# Patient Record
Sex: FEMALE | Race: WHITE | NOT HISPANIC OR LATINO | URBAN - METROPOLITAN AREA
[De-identification: names, ages, dates, MRNs, and addresses within clinical notes are randomized per-mention and may not be internally consistent; named-entity substitution may affect disease eponyms.]

---

## 2017-11-02 ENCOUNTER — IMPORTED ENCOUNTER (OUTPATIENT)
Dept: URBAN - METROPOLITAN AREA CLINIC 59 | Facility: CLINIC | Age: 77
End: 2017-11-02

## 2017-11-02 PROBLEM — E11.9 TYPE II DM W/O COMPLICATIONS: Noted: 2017-11-02

## 2017-11-02 PROBLEM — H26.493 OTHER SECONDARY CATARACT, BILATERAL: Noted: 2017-11-02

## 2017-11-02 PROBLEM — H43.393 OTHER VITREOUS OPACITIES, BILATERAL: Noted: 2017-11-02

## 2017-11-02 PROBLEM — H02.34 BLEPHAROCHALASIS OF LT UPPER EYELID: Noted: 2017-11-02

## 2017-11-02 PROBLEM — H04.123 TEAR FILM INSUFFICIENCY OF BILATERAL LACRIMAL GLANDS: Noted: 2017-11-02

## 2017-11-02 PROBLEM — H02.31 PSEUDOPTOSIS OF RIGHT UPPER EYELID: Noted: 2017-11-02

## 2017-11-02 PROCEDURE — 92014 COMPRE OPH EXAM EST PT 1/>: CPT

## 2019-03-18 ENCOUNTER — IMPORTED ENCOUNTER (OUTPATIENT)
Dept: URBAN - METROPOLITAN AREA CLINIC 59 | Facility: CLINIC | Age: 79
End: 2019-03-18

## 2019-03-18 PROBLEM — H02.34 BLEPHAROCHALASIS OF LT UPPER EYELID: Noted: 2019-03-18

## 2019-03-18 PROBLEM — H43.393 OTHER VITREOUS OPACITIES, BILATERAL: Noted: 2019-03-18

## 2019-03-18 PROBLEM — E11.9 TYPE II DM W/O COMPLICATIONS: Noted: 2019-03-18

## 2019-03-18 PROBLEM — H04.123 TEAR FILM INSUFFICIENCY OF BILATERAL LACRIMAL GLANDS: Noted: 2019-03-18

## 2019-03-18 PROBLEM — H26.493 OTHER SECONDARY CATARACT, BILATERAL: Noted: 2019-03-18

## 2019-03-18 PROBLEM — H02.31 PSEUDOPTOSIS OF RIGHT UPPER EYELID: Noted: 2019-03-18

## 2019-03-18 PROCEDURE — 92014 COMPRE OPH EXAM EST PT 1/>: CPT

## 2020-08-12 ENCOUNTER — IMPORTED ENCOUNTER (OUTPATIENT)
Dept: URBAN - METROPOLITAN AREA CLINIC 59 | Facility: CLINIC | Age: 80
End: 2020-08-12

## 2020-08-12 PROBLEM — H26.492 OTHER SECONDARY CATARACT, LEFT EYE: Noted: 2020-08-12

## 2020-08-12 PROBLEM — H43.393 OTHER VITREOUS OPACITIES, BILATERAL: Noted: 2020-08-12

## 2020-08-12 PROBLEM — H26.491 PRESENCE OF INTRAOCULAR LENS: Noted: 2020-08-12

## 2020-08-12 PROBLEM — H02.34 BLEPHAROCHALASIS OF LT UPPER EYELID: Noted: 2020-08-12

## 2020-08-12 PROBLEM — H04.123 TEAR FILM INSUFFICIENCY OF BILATERAL LACRIMAL GLANDS: Noted: 2020-08-12

## 2020-08-12 PROBLEM — H02.31 PSEUDOPTOSIS OF RIGHT UPPER EYELID: Noted: 2020-08-12

## 2020-08-12 PROBLEM — E11.9 TYPE II DM W/O COMPLICATIONS: Noted: 2020-08-12

## 2020-08-12 PROCEDURE — 92014 COMPRE OPH EXAM EST PT 1/>: CPT

## 2020-09-17 ENCOUNTER — APPOINTMENT (RX ONLY)
Dept: URBAN - METROPOLITAN AREA CLINIC 348 | Facility: CLINIC | Age: 80
Setting detail: DERMATOLOGY
End: 2020-09-17

## 2020-09-17 DIAGNOSIS — L21.8 OTHER SEBORRHEIC DERMATITIS: ICD-10-CM

## 2020-09-17 PROBLEM — L29.8 OTHER PRURITUS: Status: ACTIVE | Noted: 2020-09-17

## 2020-09-17 PROCEDURE — 99202 OFFICE O/P NEW SF 15 MIN: CPT

## 2020-09-17 PROCEDURE — ? PRESCRIPTION

## 2020-09-17 PROCEDURE — ? TREATMENT REGIMEN

## 2020-09-17 PROCEDURE — ? COUNSELING

## 2020-09-17 RX ORDER — CLOBETASOL PROPIONATE 0.5 MG/ML
SOLUTION TOPICAL
Qty: 1 | Refills: 2 | Status: ERX | COMMUNITY
Start: 2020-09-17

## 2020-09-17 RX ADMIN — CLOBETASOL PROPIONATE: 0.5 SOLUTION TOPICAL at 00:00

## 2020-09-17 ASSESSMENT — LOCATION ZONE DERM: LOCATION ZONE: SCALP

## 2020-09-17 ASSESSMENT — LOCATION SIMPLE DESCRIPTION DERM: LOCATION SIMPLE: POSTERIOR SCALP

## 2020-09-17 ASSESSMENT — LOCATION DETAILED DESCRIPTION DERM: LOCATION DETAILED: POSTERIOR MID-PARIETAL SCALP

## 2020-09-17 NOTE — PROCEDURE: TREATMENT REGIMEN
Initiate Treatment: Clobetasol Solution - Apply to scalp twice daily x 2-3 weeks then only as needed for flares//avoid eyes & face
Discontinue Regimen: Jesse SIEGEL tree shampoo
Detail Level: Zone
Samples Given: Free and clear w/ 2% zinc

## 2020-10-15 ENCOUNTER — APPOINTMENT (RX ONLY)
Dept: URBAN - METROPOLITAN AREA CLINIC 348 | Facility: CLINIC | Age: 80
Setting detail: DERMATOLOGY
End: 2020-10-15

## 2020-10-15 DIAGNOSIS — L21.8 OTHER SEBORRHEIC DERMATITIS: ICD-10-CM | Status: IMPROVED

## 2020-10-15 PROCEDURE — ? COUNSELING

## 2020-10-15 PROCEDURE — 99213 OFFICE O/P EST LOW 20 MIN: CPT

## 2020-10-15 PROCEDURE — ? PRESCRIPTION

## 2020-10-15 PROCEDURE — ? TREATMENT REGIMEN

## 2020-10-15 RX ORDER — KETOCONAZOLE 20 MG/ML
SHAMPOO, SUSPENSION TOPICAL
Qty: 1 | Refills: 5 | Status: ERX | COMMUNITY
Start: 2020-10-15

## 2020-10-15 RX ADMIN — KETOCONAZOLE: 20 SHAMPOO, SUSPENSION TOPICAL at 00:00

## 2020-10-15 ASSESSMENT — LOCATION DETAILED DESCRIPTION DERM: LOCATION DETAILED: POSTERIOR MID-PARIETAL SCALP

## 2020-10-15 ASSESSMENT — LOCATION ZONE DERM: LOCATION ZONE: SCALP

## 2020-10-15 ASSESSMENT — LOCATION SIMPLE DESCRIPTION DERM: LOCATION SIMPLE: POSTERIOR SCALP

## 2020-10-15 NOTE — PROCEDURE: TREATMENT REGIMEN
Detail Level: Zone
Continue Regimen: Clobetasol Solution - Apply to scalp twice daily x 2-3 weeks then only as needed for flares//avoid eyes & face
Initiate Treatment: Ketoconozole 2% shampoo - wash scalp daily x 2 weeks then as needed for flares

## 2021-11-30 ENCOUNTER — APPOINTMENT (RX ONLY)
Dept: URBAN - METROPOLITAN AREA CLINIC 348 | Facility: CLINIC | Age: 81
Setting detail: DERMATOLOGY
End: 2021-11-30

## 2021-11-30 DIAGNOSIS — L92.3 FOREIGN BODY GRANULOMA OF THE SKIN AND SUBCUTANEOUS TISSUE: ICD-10-CM

## 2021-11-30 DIAGNOSIS — L82.1 OTHER SEBORRHEIC KERATOSIS: ICD-10-CM

## 2021-11-30 PROBLEM — E13.9 OTHER SPECIFIED DIABETES MELLITUS WITHOUT COMPLICATIONS: Status: ACTIVE | Noted: 2021-11-30

## 2021-11-30 PROBLEM — L55.1 SUNBURN OF SECOND DEGREE: Status: ACTIVE | Noted: 2021-11-30

## 2021-11-30 PROCEDURE — 99213 OFFICE O/P EST LOW 20 MIN: CPT

## 2021-11-30 PROCEDURE — ? TREATMENT REGIMEN

## 2021-11-30 PROCEDURE — ? BENIGN DESTRUCTION COSMETIC MULTI

## 2021-11-30 PROCEDURE — ? COUNSELING

## 2021-11-30 PROCEDURE — ? PRESCRIPTION

## 2021-11-30 RX ORDER — CLOBETASOL PROPIONATE 0.5 MG/G
OINTMENT TOPICAL
Qty: 60 | Refills: 1 | Status: ERX | COMMUNITY
Start: 2021-11-30

## 2021-11-30 RX ADMIN — CLOBETASOL PROPIONATE: 0.5 OINTMENT TOPICAL at 00:00

## 2021-11-30 ASSESSMENT — LOCATION DETAILED DESCRIPTION DERM
LOCATION DETAILED: LEFT MEDIAL TRAPEZIAL NECK
LOCATION DETAILED: RIGHT ANTERIOR SHOULDER
LOCATION DETAILED: MID TRAPEZIAL NECK
LOCATION DETAILED: LEFT CLAVICULAR NECK
LOCATION DETAILED: LEFT INFERIOR LATERAL UPPER BACK
LOCATION DETAILED: RIGHT LATERAL TRAPEZIAL NECK
LOCATION DETAILED: LEFT SUPERIOR UPPER BACK
LOCATION DETAILED: RIGHT MEDIAL TRAPEZIAL NECK
LOCATION DETAILED: LEFT LATERAL TRAPEZIAL NECK
LOCATION DETAILED: RIGHT CLAVICULAR NECK

## 2021-11-30 ASSESSMENT — LOCATION ZONE DERM
LOCATION ZONE: ARM
LOCATION ZONE: NECK
LOCATION ZONE: TRUNK

## 2021-11-30 ASSESSMENT — LOCATION SIMPLE DESCRIPTION DERM
LOCATION SIMPLE: POSTERIOR NECK
LOCATION SIMPLE: RIGHT SHOULDER
LOCATION SIMPLE: LEFT UPPER BACK
LOCATION SIMPLE: LEFT ANTERIOR NECK
LOCATION SIMPLE: TRAPEZIAL NECK
LOCATION SIMPLE: RIGHT ANTERIOR NECK

## 2021-11-30 NOTE — PROCEDURE: BENIGN DESTRUCTION COSMETIC MULTI
Anesthesia Volume In Cc: 0.5
Consent: The patient's consent was obtained including but not limited to risks of crusting, scabbing, blistering, scarring, darker or lighter pigmentary change, recurrence, incomplete removal and infection.
Total Number Of Lesions Treated: 10
Price (Use Numbers Only, No Special Characters Or $): 100
Post-Care Instructions: I reviewed with the patient in detail post-care instructions. Patient is to wear sunprotection, and avoid picking at any of the treated lesions. Pt may apply Vaseline to crusted or scabbing areas.
Detail Level: Zone

## 2021-11-30 NOTE — PROCEDURE: TREATMENT REGIMEN
Initiate Treatment: Clobetasol ointment- Apple to Affected areas once daily for 2 weeks then as needed for flares
Detail Level: Zone

## 2022-01-12 ENCOUNTER — IMPORTED ENCOUNTER (OUTPATIENT)
Dept: URBAN - METROPOLITAN AREA CLINIC 59 | Facility: CLINIC | Age: 82
End: 2022-01-12

## 2022-01-12 PROBLEM — H04.123 TEAR FILM INSUFFICIENCY OF BILATERAL LACRIMAL GLANDS: Noted: 2022-01-12

## 2022-01-12 PROBLEM — H02.34 BLEPHAROCHALASIS OF LT UPPER EYELID: Noted: 2022-01-12

## 2022-01-12 PROBLEM — H26.491 PRESENCE OF INTRAOCULAR LENS: Noted: 2022-01-12

## 2022-01-12 PROBLEM — E11.9 TYPE II DM W/O COMPLICATIONS: Noted: 2022-01-12

## 2022-01-12 PROBLEM — H43.393 OTHER VITREOUS OPACITIES, BILATERAL: Noted: 2022-01-12

## 2022-01-12 PROBLEM — H26.492 OTHER SECONDARY CATARACT, LEFT EYE: Noted: 2022-01-12

## 2022-01-12 PROBLEM — H26.493 OTHER SECONDARY CATARACT, LEFT EYE: Noted: 2022-01-12

## 2022-01-12 PROBLEM — H26.493 PRESENCE OF INTRAOCULAR LENS: Noted: 2022-01-12

## 2022-01-12 PROBLEM — H02.31 PSEUDOPTOSIS OF RIGHT UPPER EYELID: Noted: 2022-01-12

## 2022-01-12 PROCEDURE — 92014 COMPRE OPH EXAM EST PT 1/>: CPT

## 2023-02-01 ENCOUNTER — ESTABLISHED COMPREHENSIVE EXAM (OUTPATIENT)
Dept: URBAN - METROPOLITAN AREA CLINIC 22 | Facility: CLINIC | Age: 83
End: 2023-02-01

## 2023-02-01 DIAGNOSIS — E11.9: ICD-10-CM

## 2023-02-01 DIAGNOSIS — H43.813: ICD-10-CM

## 2023-02-01 DIAGNOSIS — H26.493: ICD-10-CM

## 2023-02-01 DIAGNOSIS — H04.123: ICD-10-CM

## 2023-02-01 PROCEDURE — 92014 COMPRE OPH EXAM EST PT 1/>: CPT

## 2023-02-01 ASSESSMENT — TONOMETRY
OD_IOP_MMHG: 15
OS_IOP_MMHG: 15

## 2023-02-01 ASSESSMENT — VISUAL ACUITY
OU_CC: J1
OD_CC: 20/20
OS_CC: 20/20

## 2023-10-15 ASSESSMENT — VISUAL ACUITY
OD_CC: 20/20-2
OD_CC: 20/20-3
OD_CC: J1+
OS_CC: J1+
OS_CC: 20/20-2
OD_CC: 20/25
OS_CC: 20/20-2
OD_BAT: 20/40
OS_CC: 20/30-1
OS_BAT: 20/40
OS_CC: 20/30+1
OD_CC: 20/20-2

## 2023-10-15 ASSESSMENT — TONOMETRY
OS_IOP_MMHG: 15
OD_IOP_MMHG: 14
OS_IOP_MMHG: 12
OD_IOP_MMHG: 13
OS_IOP_MMHG: 14
OD_IOP_MMHG: 12
OS_IOP_MMHG: 13
OD_IOP_MMHG: 15

## 2023-10-15 ASSESSMENT — PACHYMETRY
OS_CT_UM: C:  FINAL: 0.000; P:
OD_CT_UM: C:  FINAL: 0.000; P:

## 2023-11-20 ENCOUNTER — ESTABLISHED COMPREHENSIVE EXAM (OUTPATIENT)
Dept: URBAN - METROPOLITAN AREA CLINIC 22 | Facility: CLINIC | Age: 83
End: 2023-11-20

## 2023-11-20 DIAGNOSIS — H04.123: ICD-10-CM

## 2023-11-20 DIAGNOSIS — H43.813: ICD-10-CM

## 2023-11-20 DIAGNOSIS — E11.9: ICD-10-CM

## 2023-11-20 DIAGNOSIS — H26.493: ICD-10-CM

## 2023-11-20 PROCEDURE — 92014 COMPRE OPH EXAM EST PT 1/>: CPT

## 2023-11-20 ASSESSMENT — TONOMETRY
OS_IOP_MMHG: 14
OD_IOP_MMHG: 14

## 2023-11-20 ASSESSMENT — VISUAL ACUITY
OS_SC: 20/20
OD_SC: 20/20-1

## 2025-01-22 ENCOUNTER — ESTABLISHED COMPREHENSIVE EXAM (OUTPATIENT)
Dept: URBAN - METROPOLITAN AREA CLINIC 22 | Facility: CLINIC | Age: 85
End: 2025-01-22

## 2025-01-22 DIAGNOSIS — E11.9: ICD-10-CM

## 2025-01-22 DIAGNOSIS — H26.493: ICD-10-CM

## 2025-01-22 DIAGNOSIS — H43.813: ICD-10-CM

## 2025-01-22 DIAGNOSIS — G45.3: ICD-10-CM

## 2025-01-22 DIAGNOSIS — H04.123: ICD-10-CM

## 2025-01-22 PROCEDURE — 92014 COMPRE OPH EXAM EST PT 1/>: CPT

## 2025-01-22 ASSESSMENT — VISUAL ACUITY
OS_CC: 20/25
OD_CC: 20/25+1

## 2025-01-22 ASSESSMENT — TONOMETRY
OD_IOP_MMHG: 22
OS_IOP_MMHG: 22